# Patient Record
Sex: FEMALE | Race: OTHER | Employment: FULL TIME | URBAN - METROPOLITAN AREA
[De-identification: names, ages, dates, MRNs, and addresses within clinical notes are randomized per-mention and may not be internally consistent; named-entity substitution may affect disease eponyms.]

---

## 2018-07-13 ENCOUNTER — HOSPITAL ENCOUNTER (OUTPATIENT)
Dept: PHYSICAL THERAPY | Age: 23
Discharge: HOME | End: 2018-07-13
Payer: COMMERCIAL

## 2018-07-13 PROCEDURE — 97140 MANUAL THERAPY 1/> REGIONS: CPT

## 2018-07-13 PROCEDURE — 97161 PT EVAL LOW COMPLEX 20 MIN: CPT

## 2018-07-13 NOTE — THERAPY EVALUATION
Karlee Friend  : 1995  Payor: Siobhan Bey / Plan: [facility] / Product Type: PPO /  Therapy Center at [address]  [address], [address], [place]  [phone]   [phone]        OUTPATIENT PHYSICAL THERAPY:Initial Assessment 2018   ICD-10: Treatment Diagnosis: Low back pain (M54.5), Pain in thoracic spine (M54.6)  Precautions/Allergies:   Review of patient's allergies indicates no known allergies. Fall Risk Score: 0 (? 5 = High Risk)  MD Orders: evaluate and treat MEDICAL/REFERRING DIAGNOSIS:  back   DATE OF ONSET: 4 weeks ago  REFERRING PHYSICIAN: Adelina Steinberg MD  RETURN PHYSICIAN APPOINTMENT: --     INITIAL ASSESSMENT:  Ms. Teressa Conrad presents with thoracic level back pain with pain into cervical and lumbar spines. Pt will benefit from skilled physical therapy to address dysfunctions and pain. She moves to [facility]  or , so will be seen in our clinic until she moves. PROBLEM LIST (Impacting functional limitations):  1. Decreased ADL/Functional Activities  2. Increased Pain  3. Decreased [place] with Home Exercise Program INTERVENTIONS PLANNED:  1. Home Exercise Program (HEP)  2. Manual Therapy including joint and soft tissue manipulation and mobilization, and dry needling  3. Therapeutic Exercise/Strengthening  4. Modalities including heat/cold application and electrical stimulation   TREATMENT PLAN:  Effective Dates: 18 TO 18. Frequency/Duration: 2 times a week for 2 weeks  GOALS: (Goals have been discussed and agreed upon with patient.)  Discharge Goals: Time Frame: 2 weeks  1. Pt will be independent with > or = 4 exercises in HEP. Rehabilitation Potential For Stated Goals: Good  Regarding Chantel Robertson's therapy, I certify that the treatment plan above will be carried out by a therapist or under their direction.   Thank you for this referral,  Jamey [place], PT, DPT                 The information in this section was collected on 7/13/18 (except where otherwise noted). HISTORY:   History of Present Injury/Illness (Reason for Referral):  3-4 weeks ago got L thoracic back pain. Hx of thai placement in thoracic spine for scoliosis in 2010. Was given mm relaxors which helped initially but then stopped working. Pain with prolonged sitting, relieved by lying down. Also pain up into neck and into low back. Past Medical History/Comorbidities:   Ms. Eugenia Hedrick  has a past medical history of Scoliosis. Ms. Eugenia Hedrick  has a past surgical history that includes hx back surgery (2010). Social History/Living Environment:     house, moving to [place] in a week  Prior Level of Function/Work/Activity:  student  Dominant Side:         RIGHT  Current Medications:       Current Outpatient Prescriptions:     tiZANidine (ZANAFLEX) 4 mg tablet, Take 1 Tab by mouth three (3) times daily. , Disp: 40 Tab, Rfl: 0   Date Last Reviewed:  7/13/2018     Number of Personal Factors/Comorbidities that affect the Plan of Care: 0: LOW COMPLEXITY   EXAMINATION:   Observation/Orthostatic Postural Assessment:          Standing: no issues        Ambulation: no issues    Palpation:          Increased tenderness to PAs to R mid-upper thoracic ribs, and associated paraspinals, and to R levator scap, UT, mid-trap/rhomboids        Thai is palpable along the right side of thoracic spine, much more prominent than other side  ROM/Strength  Joint/Movement  Range of Motion- eval ROM - reassess  Date:  Strength- eval Strength - reassess  Date:   --                               Balance:          No functional deficits noted. Body Structures Involved:  1. Nerves  2. Bones  3. Joints  4. Muscles Body Functions Affected:  1. Sensory/Pain  2. Neuromusculoskeletal  3. Movement Related Activities and Participation Affected:  1. General Tasks and Demands  2. Domestic Life  3.  Community, Social and [place]   Number of elements (examined above) that affect the Plan of Care: 1-2: LOW COMPLEXITY   CLINICAL PRESENTATION:   Presentation: Stable and uncomplicated: LOW COMPLEXITY   CLINICAL DECISION MAKING:   Outcome Measure: Tool Used: Modified Oswestry Low Back Pain Questionnaire  Score:  Initial: 19/50  Most Recent: X/50 (Date: -- )   Interpretation of Score: Each section is scored on a 0-5 scale, 5 representing the greatest disability. The scores of each section are added together for a total score of 50. Score 0 1-10 11-20 21-30 31-40 41-49 50   Modifier CH CI CJ CK CL CM CN     Medical Necessity:   · Skilled intervention continues to be required due to decreased function. Reason for Services/Other Comments:  · Patient continues to require skilled intervention due to decreased function. Use of outcome tool(s) and clinical judgement create a POC that gives a: Clear prediction of patient's progress: LOW COMPLEXITY            TREATMENT:   (In addition to Assessment/Re-Assessment sessions the following treatments were rendered)  Pre-treatment Symptoms/Complaints:  5-6/10 back pain  Pain: Initial:     5-6/10 Post Session:  Decreased tightness     THERAPEUTIC EXERCISE: ( minutes):  Exercises per grid below to improve mobility and strength. Date:  7/13 Date:   Date:     Activity/Exercise Parameters Parameters Parameters   Pt education Ball roll on wall for self-MFR                                             MANUAL THERAPY: ( 25 minutes): To increase motion and reduce pain  - L scapular mobilization  - grade III PA mobilization to mid-upper thoracic ribs  - instrument assisted soft tissue technique using dry needles to trigger points in: R thoracic paraspinals, R mid-trap, R upper trap, R levator scap    MODALITIES: ( minutes):       Treatment/Session Assessment:    · Response to Treatment:  Pt was agreeable to manual therapy, no complications reported. · Compliance with Program/Exercises: Will assess as treatment progresses.   · Recommendations/Intent for next treatment session: \"Next visit will focus on advancements to more challenging activities\".   Total Treatment Duration:  PT Patient Time In/Time Out  Time In: 1000  Time Out: 1045    Future Appointments  Date Time Provider Milton Mohan   7/16/2018 8:30 AM Andie Ch, PT, DPT [facility]   7/18/2018 1:15 PM Andie Ch, PT, DPT [facility]       Andie Ch, PT, DPT

## 2018-07-13 NOTE — PROGRESS NOTES
Ambulatory/Rehab Services H2 Model Falls Risk Assessment    Risk Factor Pts. ·   Confusion/Disorientation/Impulsivity  []    4 ·   Symptomatic Depression  []   2 ·   Altered Elimination  []   1 ·   Dizziness/Vertigo  []   1 ·   Gender (Male)  []   1 ·   Any administered antiepileptics (anticonvulsants):  []   2 ·   Any administered benzodiazepines:  []   1 ·   Visual Impairment (specify):  []   1 ·   Portable Oxygen Use  []   1 ·   Orthostatic ? BP  []   1 ·   History of Recent Falls (within 3 mos.)  []   5     Ability to Rise from Chair (choose one) Pts. ·   Ability to rise in a single movement  []   0 ·   Pushes up, successful in one attempt  []   1 ·   Multiple attempts, but successful  []   3 ·   Unable to rise without assistance  []   4   Total: (5 or greater = High Risk) 0     Falls Prevention Plan:   []                Physical Limitations to Exercise (specify):   []                Mobility Assistance Device (type):   []                Exercise/Equipment Adaptation (specify):    ©2010 [facility] [address] Patent #[id number].  Federal Law prohibits the replication, distribution or use without written permission from [facility] [employer]

## 2018-07-16 ENCOUNTER — HOSPITAL ENCOUNTER (OUTPATIENT)
Dept: PHYSICAL THERAPY | Age: 23
Discharge: HOME | End: 2018-07-16
Payer: COMMERCIAL

## 2018-07-16 PROCEDURE — 97110 THERAPEUTIC EXERCISES: CPT

## 2018-07-16 PROCEDURE — 97140 MANUAL THERAPY 1/> REGIONS: CPT

## 2018-07-16 NOTE — PROGRESS NOTES
Napoleon Cross  : 1995  Payor: Dayanara Brown / Plan: [facility] / Product Type: PPO /  [id number]  at [facility]  [address], [address], [place]  [phone]   [phone]        OUTPATIENT PHYSICAL THERAPY:Daily Note 2018   ICD-10: Treatment Diagnosis: Low back pain (M54.5), Pain in thoracic spine (M54.6)  Precautions/Allergies:   Review of patient's allergies indicates no known allergies. Fall Risk Score: 0 (? 5 = High Risk)  MD Orders: evaluate and treat MEDICAL/REFERRING DIAGNOSIS:  back   DATE OF ONSET: 4 weeks ago  REFERRING PHYSICIAN: Liz Castillo MD  RETURN PHYSICIAN APPOINTMENT: --     INITIAL ASSESSMENT:  Ms. Rylie Dean presents with thoracic level back pain with pain into cervical and lumbar spines. Pt will benefit from skilled physical therapy to address dysfunctions and pain. She moves to [facility]  or , so will be seen in our clinic until she moves. PROBLEM LIST (Impacting functional limitations):  1. Decreased ADL/Functional Activities  2. Increased Pain  3. Decreased [place] with Home Exercise Program INTERVENTIONS PLANNED:  1. Home Exercise Program (HEP)  2. Manual Therapy including joint and soft tissue manipulation and mobilization, and dry needling  3. Therapeutic Exercise/Strengthening  4. Modalities including heat/cold application and electrical stimulation   TREATMENT PLAN:  Effective Dates: 18 TO 18. Frequency/Duration: 2 times a week for 2 weeks  GOALS: (Goals have been discussed and agreed upon with patient.)  Discharge Goals: Time Frame: 2 weeks  1. Pt will be independent with > or = 4 exercises in HEP. Rehabilitation Potential For Stated Goals: Good  Regarding Chantel Lopezda's therapy, I certify that the treatment plan above will be carried out by a therapist or under their direction.   Thank you for this referral,  Ariana Brooks, PT, DPT                 The information in this section was collected on 7/13/18 (except where otherwise noted). HISTORY:   History of Present Injury/Illness (Reason for Referral):  3-4 weeks ago got L thoracic back pain. Hx of thai placement in thoracic spine for scoliosis in 2010. Was given mm relaxors which helped initially but then stopped working. Pain with prolonged sitting, relieved by lying down. Also pain up into neck and into low back. Past Medical History/Comorbidities:   Ms. Brittnee Lazo  has a past medical history of Scoliosis. Ms. Brittnee Lazo  has a past surgical history that includes hx back surgery (2010). Social History/Living Environment:     house, moving to [facility] in a week  Prior Level of Function/Work/Activity:  student  Dominant Side:         RIGHT  Current Medications:       Current Outpatient Prescriptions:     tiZANidine (ZANAFLEX) 4 mg tablet, Take 1 Tab by mouth three (3) times daily. , Disp: 40 Tab, Rfl: 0   Date Last Reviewed:  7/16/2018     Number of Personal Factors/Comorbidities that affect the Plan of Care: 0: LOW COMPLEXITY   EXAMINATION:   Observation/Orthostatic Postural Assessment:          Standing: no issues        Ambulation: no issues    Palpation:          Increased tenderness to PAs to R mid-upper thoracic ribs, and associated paraspinals, and to R levator scap, UT, mid-trap/rhomboids        Thai is palpable along the right side of thoracic spine, much more prominent than other side  ROM/Strength  Joint/Movement  Range of Motion- eval ROM - reassess  Date:  Strength- eval Strength - reassess  Date:   --                               Balance:          No functional deficits noted. Body Structures Involved:  1. Nerves  2. Bones  3. Joints  4. Muscles Body Functions Affected:  1. Sensory/Pain  2. Neuromusculoskeletal  3. Movement Related Activities and Participation Affected:  1. General Tasks and Demands  2. Domestic Life  3.  Community, Social and [place]   Number of elements (examined above) that affect the Plan of Care: 1-2: LOW COMPLEXITY   CLINICAL PRESENTATION:   Presentation: Stable and uncomplicated: LOW COMPLEXITY   CLINICAL DECISION MAKING:   Outcome Measure: Tool Used: Modified Oswestry Low Back Pain Questionnaire  Score:  Initial: 19/50  Most Recent: X/50 (Date: -- )   Interpretation of Score: Each section is scored on a 0-5 scale, 5 representing the greatest disability. The scores of each section are added together for a total score of 50. Score 0 1-10 11-20 21-30 31-40 41-49 50   Modifier CH CI CJ CK CL CM CN     Medical Necessity:   · Skilled intervention continues to be required due to decreased function. Reason for Services/Other Comments:  · Patient continues to require skilled intervention due to decreased function. Use of outcome tool(s) and clinical judgement create a POC that gives a: Clear prediction of patient's progress: LOW COMPLEXITY            TREATMENT:   (In addition to Assessment/Re-Assessment sessions the following treatments were rendered)  Pre-treatment Symptoms/Complaints: pt reports pain reduction from initial eval. Had one instance of sharp pain in back but went away quickly. Drove to [place] yesterday and felt good. Pain: Initial:      Post Session:  Decreased tightness     THERAPEUTIC EXERCISE: ( 30 minutes):  Exercises per grid below to improve mobility and strength. Date:  7/13 Date:  7/16 Date:     Activity/Exercise Parameters Parameters Parameters   Pt education Ball roll on wall for self-MFR     ube  4/4 level 3    rows  ytb x 20    Shoulder extension  ytb x 20    Horizontal abduction at 30 deg, 90 deg shoulder elevation  ytb x 20 ea    Shoulder flexion  ytb x 10    Scapular protraction                MANUAL THERAPY: ( 15 minutes): To increase motion and reduce pain  - L scapular mobilization  - grade III PA mobilization to mid-upper thoracic ribs    MODALITIES: ( minutes):       Access Code: [email or web address]   URL: [email or web address]   Date: 07/16/2018 Prepared by: Kasey Bending     Exercises  Seated Shoulder Horizontal Abduction with Resistance - Thumbs Up - 15 reps - 2x daily  Standing Shoulder Row with Anchored Resistance - 15 reps - 2x daily    Treatment/Session Assessment:    · Response to Treatment:  Pt demonstrates L scapular winging, general decreased strength and control in left scapular region. Next tx consider quadruped UE flexion, single arm rows at various angles, supine scapular protraction. · Compliance with Program/Exercises: Will assess as treatment progresses. · Recommendations/Intent for next treatment session: \"Next visit will focus on advancements to more challenging activities\".   Total Treatment Duration:  PT Patient Time In/Time Out  Time In: 0830  Time Out: 0915    Future Appointments  Date Time Provider Milton Mohan   7/18/2018 1:15 PM [facility]       Keira Elena, PT, DPT

## 2018-07-18 ENCOUNTER — HOSPITAL ENCOUNTER (OUTPATIENT)
Dept: PHYSICAL THERAPY | Age: 23
Discharge: HOME | End: 2018-07-18
Payer: COMMERCIAL

## 2018-07-18 PROCEDURE — 97110 THERAPEUTIC EXERCISES: CPT

## 2018-07-18 PROCEDURE — 97140 MANUAL THERAPY 1/> REGIONS: CPT

## 2018-07-18 NOTE — PROGRESS NOTES
Michelet Joy  : 1995  Payor: Prema Jennings / Plan: [facility] / Product Type: PPO /  [address]  at [facility]  [address], [address], [place]  [phone]   [phone]        OUTPATIENT PHYSICAL THERAPY:Daily Note 2018   ICD-10: Treatment Diagnosis: Low back pain (M54.5), Pain in thoracic spine (M54.6)  Precautions/Allergies:   Review of patient's allergies indicates no known allergies. Fall Risk Score: 0 (? 5 = High Risk)  MD Orders: evaluate and treat MEDICAL/REFERRING DIAGNOSIS:  back   DATE OF ONSET: 4 weeks ago  REFERRING PHYSICIAN: Kim Duque MD  RETURN PHYSICIAN APPOINTMENT: --     INITIAL ASSESSMENT:  Ms. Dianelys Estrada presents with thoracic level back pain with pain into cervical and lumbar spines. Pt will benefit from skilled physical therapy to address dysfunctions and pain. She moves to [facility]  or , so will be seen in our clinic until she moves. PROBLEM LIST (Impacting functional limitations):  1. Decreased ADL/Functional Activities  2. Increased Pain  3. Decreased [place] with Home Exercise Program INTERVENTIONS PLANNED:  1. Home Exercise Program (HEP)  2. Manual Therapy including joint and soft tissue manipulation and mobilization, and dry needling  3. Therapeutic Exercise/Strengthening  4. Modalities including heat/cold application and electrical stimulation   TREATMENT PLAN:  Effective Dates: 18 TO 18. Frequency/Duration: 2 times a week for 2 weeks  GOALS: (Goals have been discussed and agreed upon with patient.)  Discharge Goals: Time Frame: 2 weeks  1. Pt will be independent with > or = 4 exercises in HEP. Rehabilitation Potential For Stated Goals: Good  Regarding Chantel Lopezda's therapy, I certify that the treatment plan above will be carried out by a therapist or under their direction.   Thank you for this referral,  Angela Albarran, PT, DPT                 The information in this section was collected on 7/13/18 (except where otherwise noted). HISTORY:   History of Present Injury/Illness (Reason for Referral):  3-4 weeks ago got L thoracic back pain. Hx of thai placement in thoracic spine for scoliosis in 2010. Was given mm relaxors which helped initially but then stopped working. Pain with prolonged sitting, relieved by lying down. Also pain up into neck and into low back. Past Medical History/Comorbidities:   Ms. Dianelys Estrada  has a past medical history of Scoliosis. Ms. Dianelys Estrada  has a past surgical history that includes hx back surgery (2010). Social History/Living Environment:     house, moving to [place] in a week  Prior Level of Function/Work/Activity:  student  Dominant Side:         RIGHT  Current Medications:       Current Outpatient Prescriptions:     tiZANidine (ZANAFLEX) 4 mg tablet, Take 1 Tab by mouth three (3) times daily. , Disp: 40 Tab, Rfl: 0   Date Last Reviewed:  7/18/2018     Number of Personal Factors/Comorbidities that affect the Plan of Care: 0: LOW COMPLEXITY   EXAMINATION:   Observation/Orthostatic Postural Assessment:          Standing: no issues        Ambulation: no issues    Palpation:          Increased tenderness to PAs to R mid-upper thoracic ribs, and associated paraspinals, and to R levator scap, UT, mid-trap/rhomboids        Thai is palpable along the right side of thoracic spine, much more prominent than other side  ROM/Strength  Joint/Movement  Range of Motion- eval ROM - reassess  Date:  Strength- eval Strength - reassess  Date:   --                               Balance:          No functional deficits noted. Body Structures Involved:  1. Nerves  2. Bones  3. Joints  4. Muscles Body Functions Affected:  1. Sensory/Pain  2. Neuromusculoskeletal  3. Movement Related Activities and Participation Affected:  1. General Tasks and Demands  2. Domestic Life  3.  Community, Social and [place]   Number of elements (examined above) that affect the Plan of Care: 1-2: LOW COMPLEXITY   CLINICAL PRESENTATION:   Presentation: Stable and uncomplicated: LOW COMPLEXITY   CLINICAL DECISION MAKING:   Outcome Measure: Tool Used: Modified Oswestry Low Back Pain Questionnaire  Score:  Initial: 19/50  Most Recent: X/50 (Date: -- )   Interpretation of Score: Each section is scored on a 0-5 scale, 5 representing the greatest disability. The scores of each section are added together for a total score of 50. Score 0 1-10 11-20 21-30 31-40 41-49 50   Modifier CH CI CJ CK CL CM CN     Medical Necessity:   · Skilled intervention continues to be required due to decreased function. Reason for Services/Other Comments:  · Patient continues to require skilled intervention due to decreased function. Use of outcome tool(s) and clinical judgement create a POC that gives a: Clear prediction of patient's progress: LOW COMPLEXITY            TREATMENT:   (In addition to Assessment/Re-Assessment sessions the following treatments were rendered)  Pre-treatment Symptoms/Complaints: pt reports one instance yesterday of sharp stabbing pain in mid back, layed down for 30 minutes and it went away. This was the only recent instance of this. Pain: Initial:      Post Session:  Decreased tightness     THERAPEUTIC EXERCISE: ( 30 minutes):  Exercises per grid below to improve mobility and strength. Date:  7/13 Date:  7/16 Date:  7/18   Activity/Exercise Parameters Parameters Parameters   Pt education Ball roll on wall for self-MFR     ube  4/4 level 3 4/4 level 3   rows  ytb x 20    Shoulder extension  ytb x 20    Horizontal abduction at 30 deg, 90 deg shoulder elevation  ytb x 20 ea    Shoulder flexion  ytb x 10    Scapular protraction      Quadruped UE flexion   X 10   Quadruped UE horizontal abduction   X 10   Prone I, T, Ys   X 20 ea   Prone row   L UE x 20                         MANUAL THERAPY: ( 30 minutes):  To increase motion and reduce pain  - L scapular mobilization  - grade III PA mobilization to mid-upper thoracic ribs  - instrument assisted soft tissue technique using dry needles to trigger points in L thoracic paraspinals, L mid-trap, L rhomboids    MODALITIES: ( minutes):       Access Code: [email or web address]   URL: [email or web address]   Date: 07/20/2018   Prepared by: Cullen Denson     Exercises  Seated Shoulder Horizontal Abduction with Resistance - Thumbs Up - 15 reps - 2x daily  Standing Shoulder Row with Anchored Resistance - 15 reps - 2x daily  Quadruped Alternating Arm Lift - 10 reps - 2 sets - 2x daily  Prone Scapular Retraction Y - 10 reps - 2 sets - 2x daily  Prone Shoulder Horizontal Abduction - 10 reps - 2 sets - 2x daily  Prone Shoulder Extension - 10 reps - 2 sets - 2x daily  Prone Shoulder Row - 10 reps - 2 sets - 2x daily    Treatment/Session Assessment:    · Response to Treatment:  Pt demonstrates significant weakness in L scapular musculature and winging compared to R. Added weight bearing activities. Today is last scheduled appointment, to let us know if she will be returning to [place] over the next few weeks. · Compliance with Program/Exercises: Will assess as treatment progresses. · Recommendations/Intent for next treatment session: \"Next visit will focus on advancements to more challenging activities\". Total Treatment Duration:  PT Patient Time In/Time Out  Time In: 1615  Time Out: 1715    No future appointments.     Angela Albarran, PT, DPT

## 2018-10-17 NOTE — PROGRESS NOTES
Caity Lopez  : 1995  Payor: Dyana Barnes / Plan: [facility] / Product Type: PPO /  Therapy Center at [facility]  [address], [address], [place] [address]  [phone]   [phone]        OUTPATIENT PHYSICAL [facility] 10/17/2018   ICD-10: Treatment Diagnosis: Low back pain (M54.5), Pain in thoracic spine (M54.6)  Precautions/Allergies:   Patient has no known allergies. Fall Risk Score: 0 (? 5 = High Risk)  MD Orders: evaluate and treat MEDICAL/REFERRING DIAGNOSIS:  back   DATE OF ONSET: 4 weeks ago  REFERRING PHYSICIAN: Lorri Isabel MD  RETURN PHYSICIAN APPOINTMENT: --     INITIAL ASSESSMENT:  Ms. Archana Christine attended 3 physical therapy treatments from 18 to 18 for thoracic and low back pain. She responded well to manual therapy and was taught a HEP for back movement and strengthening. Pt moved to [facility] at the end of the summer and was unable to continue physical therapy treatments. Plan of care is discharged. TREATMENT PLAN:  Discharge. GOALS: (Goals have been discussed and agreed upon with patient.)  Discharge Goals: Time Frame: 2 weeks  1. Pt will be independent with > or = 4 exercises in HEP.  met      Thank you for this referral,  Sindhu Parker, PT, DPT